# Patient Record
Sex: MALE | Race: WHITE | Employment: FULL TIME | URBAN - METROPOLITAN AREA
[De-identification: names, ages, dates, MRNs, and addresses within clinical notes are randomized per-mention and may not be internally consistent; named-entity substitution may affect disease eponyms.]

---

## 2020-01-07 ENCOUNTER — OFFICE VISIT (OUTPATIENT)
Dept: INTERNAL MEDICINE | Age: 40
End: 2020-01-07
Payer: COMMERCIAL

## 2020-01-07 VITALS
TEMPERATURE: 97.7 F | OXYGEN SATURATION: 98 % | SYSTOLIC BLOOD PRESSURE: 114 MMHG | HEART RATE: 56 BPM | BODY MASS INDEX: 24.28 KG/M2 | DIASTOLIC BLOOD PRESSURE: 66 MMHG | WEIGHT: 169.6 LBS | HEIGHT: 70 IN

## 2020-01-07 PROCEDURE — G8427 DOCREV CUR MEDS BY ELIG CLIN: HCPCS | Performed by: NURSE PRACTITIONER

## 2020-01-07 PROCEDURE — 4004F PT TOBACCO SCREEN RCVD TLK: CPT | Performed by: NURSE PRACTITIONER

## 2020-01-07 PROCEDURE — G8420 CALC BMI NORM PARAMETERS: HCPCS | Performed by: NURSE PRACTITIONER

## 2020-01-07 PROCEDURE — 99213 OFFICE O/P EST LOW 20 MIN: CPT | Performed by: NURSE PRACTITIONER

## 2020-01-07 PROCEDURE — G8484 FLU IMMUNIZE NO ADMIN: HCPCS | Performed by: NURSE PRACTITIONER

## 2020-01-07 RX ORDER — CEPHALEXIN 500 MG/1
500 CAPSULE ORAL 2 TIMES DAILY
Qty: 20 CAPSULE | Refills: 0 | Status: SHIPPED | OUTPATIENT
Start: 2020-01-07 | End: 2020-01-17

## 2020-01-07 RX ORDER — METHYLPREDNISOLONE 4 MG/1
TABLET ORAL
Qty: 1 KIT | Refills: 0 | Status: SHIPPED | OUTPATIENT
Start: 2020-01-07 | End: 2020-01-13

## 2020-01-07 SDOH — HEALTH STABILITY: MENTAL HEALTH: HOW OFTEN DO YOU HAVE A DRINK CONTAINING ALCOHOL?: NEVER

## 2020-01-07 ASSESSMENT — PATIENT HEALTH QUESTIONNAIRE - PHQ9
1. LITTLE INTEREST OR PLEASURE IN DOING THINGS: 0
SUM OF ALL RESPONSES TO PHQ9 QUESTIONS 1 & 2: 0
SUM OF ALL RESPONSES TO PHQ QUESTIONS 1-9: 0
2. FEELING DOWN, DEPRESSED OR HOPELESS: 0
SUM OF ALL RESPONSES TO PHQ QUESTIONS 1-9: 0

## 2020-01-07 NOTE — PROGRESS NOTES
Subjective  Latrell Reaevs, 37 y.o. male presents today with:  Chief Complaint   Patient presents with    Rash     \"keeps me up at night\" - left ankle - x couple days, has had rashes in the same place previously - no otc - red and scabby, itching        Rash   This is a new problem. Episode onset: x1 week ago. Pt states he did use a different detergent recently. The problem has been gradually worsening since onset. The affected locations include the left ankle. The rash is characterized by dryness, itchiness and redness. He was exposed to nothing. Pertinent negatives include no congestion, cough, diarrhea, fatigue, fever, joint pain, rhinorrhea, shortness of breath, sore throat or vomiting. Past treatments include nothing. Review of Systems   Constitutional: Negative for chills, fatigue and fever. HENT: Negative for congestion, ear pain, rhinorrhea and sore throat. Eyes: Negative for discharge, redness and visual disturbance. Respiratory: Negative for cough, chest tightness, shortness of breath and wheezing. Cardiovascular: Negative for chest pain, palpitations and leg swelling. Gastrointestinal: Negative for abdominal pain, diarrhea, nausea and vomiting. Musculoskeletal: Negative for arthralgias, back pain, gait problem, joint pain and joint swelling. Skin: Positive for rash. Negative for pallor and wound. Neurological: Negative for dizziness, light-headedness and headaches. Psychiatric/Behavioral: Negative for sleep disturbance and suicidal ideas. PMH, Surgical Hx, Family Hx, and Social Hx reviewed and updated. Health Maintenance reviewed. Objective  Vitals:    01/07/20 1235   BP: 114/66   Pulse: 56   Temp: 97.7 °F (36.5 °C)   SpO2: 98%   Weight: 169 lb 9.6 oz (76.9 kg)   Height: 5' 10\" (1.778 m)     BP Readings from Last 3 Encounters:   01/07/20 114/66     Wt Readings from Last 3 Encounters:   01/07/20 169 lb 9.6 oz (76.9 kg)     Physical Exam  Vitals signs reviewed. encounter. Orders Placed This Encounter   Medications    cephALEXin (KEFLEX) 500 MG capsule     Sig: Take 1 capsule by mouth 2 times daily for 10 days     Dispense:  20 capsule     Refill:  0    methylPREDNISolone (MEDROL DOSEPACK) 4 MG tablet     Sig: Take by mouth as directed. Dispense:  1 kit     Refill:  0     Return in about 2 weeks (around 1/21/2020), or if symptoms worsen or fail to improve. Reviewed with the patient: current clinical status,medications, activities and diet. Epsom salt soaks  Antihistamine as needed    Side effects, adverse effects of themedication prescribed today, as well as treatment plan/ rationale and result expectations have been discussed with the patient who expresses understanding and desires to proceed. Close follow up to evaluate treatment results and for coordination of care. I have reviewed the patient's medical history in detail and updated the computerized patient record.     JOCELINE Franco

## 2020-01-08 ASSESSMENT — ENCOUNTER SYMPTOMS
RHINORRHEA: 0
DIARRHEA: 0
WHEEZING: 0
BACK PAIN: 0
SORE THROAT: 0
VOMITING: 0
NAUSEA: 0
EYE REDNESS: 0
ABDOMINAL PAIN: 0
SHORTNESS OF BREATH: 0
CHEST TIGHTNESS: 0
COUGH: 0
EYE DISCHARGE: 0

## 2020-09-05 ENCOUNTER — HOSPITAL ENCOUNTER (EMERGENCY)
Age: 40
Discharge: PSYCHIATRIC OR SUBSTANCE USE FACILITY | End: 2020-09-06

## 2020-09-05 LAB
ACETAMINOPHEN LEVEL: <5 UG/ML (ref 10–30)
ALBUMIN SERPL-MCNC: 4.3 G/DL (ref 3.5–4.6)
ALP BLD-CCNC: 69 U/L (ref 35–104)
ALT SERPL-CCNC: 18 U/L (ref 0–41)
AMPHETAMINE SCREEN, URINE: POSITIVE
ANION GAP SERPL CALCULATED.3IONS-SCNC: 14 MEQ/L (ref 9–15)
AST SERPL-CCNC: 18 U/L (ref 0–40)
BARBITURATE SCREEN URINE: ABNORMAL
BASOPHILS ABSOLUTE: 0 K/UL (ref 0–0.2)
BASOPHILS RELATIVE PERCENT: 0.7 %
BENZODIAZEPINE SCREEN, URINE: ABNORMAL
BILIRUB SERPL-MCNC: 0.3 MG/DL (ref 0.2–0.7)
BILIRUBIN URINE: NEGATIVE
BLOOD, URINE: NEGATIVE
BUN BLDV-MCNC: 18 MG/DL (ref 6–20)
CALCIUM SERPL-MCNC: 9.1 MG/DL (ref 8.5–9.9)
CANNABINOID SCREEN URINE: POSITIVE
CHLORIDE BLD-SCNC: 99 MEQ/L (ref 95–107)
CHOLESTEROL, TOTAL: 218 MG/DL (ref 0–199)
CLARITY: CLEAR
CO2: 22 MEQ/L (ref 20–31)
COCAINE METABOLITE SCREEN URINE: ABNORMAL
COLOR: YELLOW
CREAT SERPL-MCNC: 1.06 MG/DL (ref 0.7–1.2)
EOSINOPHILS ABSOLUTE: 0.2 K/UL (ref 0–0.7)
EOSINOPHILS RELATIVE PERCENT: 3.2 %
ETHANOL PERCENT: NORMAL G/DL
ETHANOL: <10 MG/DL (ref 0–0.08)
GFR AFRICAN AMERICAN: >60
GFR NON-AFRICAN AMERICAN: >60
GLOBULIN: 3 G/DL (ref 2.3–3.5)
GLUCOSE BLD-MCNC: 98 MG/DL (ref 70–99)
GLUCOSE URINE: NEGATIVE MG/DL
HCT VFR BLD CALC: 43.6 % (ref 42–52)
HDLC SERPL-MCNC: 65 MG/DL (ref 40–59)
HEMOGLOBIN: 15.1 G/DL (ref 14–18)
KETONES, URINE: NEGATIVE MG/DL
LDL CHOLESTEROL CALCULATED: 107 MG/DL (ref 0–129)
LEUKOCYTE ESTERASE, URINE: NEGATIVE
LYMPHOCYTES ABSOLUTE: 2.1 K/UL (ref 1–4.8)
LYMPHOCYTES RELATIVE PERCENT: 29.3 %
Lab: ABNORMAL
MCH RBC QN AUTO: 31.8 PG (ref 27–31.3)
MCHC RBC AUTO-ENTMCNC: 34.6 % (ref 33–37)
MCV RBC AUTO: 91.8 FL (ref 80–100)
METHADONE SCREEN, URINE: ABNORMAL
MONOCYTES ABSOLUTE: 0.6 K/UL (ref 0.2–0.8)
MONOCYTES RELATIVE PERCENT: 8.9 %
NEUTROPHILS ABSOLUTE: 4.2 K/UL (ref 1.4–6.5)
NEUTROPHILS RELATIVE PERCENT: 57.9 %
NITRITE, URINE: NEGATIVE
OPIATE SCREEN URINE: ABNORMAL
OXYCODONE URINE: ABNORMAL
PDW BLD-RTO: 13.3 % (ref 11.5–14.5)
PH UA: 6 (ref 5–9)
PHENCYCLIDINE SCREEN URINE: ABNORMAL
PLATELET # BLD: 212 K/UL (ref 130–400)
POTASSIUM SERPL-SCNC: 4 MEQ/L (ref 3.4–4.9)
PROPOXYPHENE SCREEN: ABNORMAL
PROTEIN UA: NEGATIVE MG/DL
RBC # BLD: 4.75 M/UL (ref 4.7–6.1)
SALICYLATE, SERUM: <0.3 MG/DL (ref 15–30)
SARS-COV-2, NAAT: NOT DETECTED
SODIUM BLD-SCNC: 135 MEQ/L (ref 135–144)
SPECIFIC GRAVITY UA: 1.02 (ref 1–1.03)
TOTAL CK: 91 U/L (ref 0–190)
TOTAL PROTEIN: 7.3 G/DL (ref 6.3–8)
TRIGL SERPL-MCNC: 229 MG/DL (ref 0–150)
URINE REFLEX TO CULTURE: NORMAL
UROBILINOGEN, URINE: 0.2 E.U./DL
WBC # BLD: 7.3 K/UL (ref 4.8–10.8)

## 2020-09-05 PROCEDURE — 6370000000 HC RX 637 (ALT 250 FOR IP): Performed by: PHYSICIAN ASSISTANT

## 2020-09-05 PROCEDURE — 80053 COMPREHEN METABOLIC PANEL: CPT

## 2020-09-05 PROCEDURE — 36415 COLL VENOUS BLD VENIPUNCTURE: CPT

## 2020-09-05 PROCEDURE — U0002 COVID-19 LAB TEST NON-CDC: HCPCS

## 2020-09-05 PROCEDURE — 6370000000 HC RX 637 (ALT 250 FOR IP): Performed by: NURSE PRACTITIONER

## 2020-09-05 PROCEDURE — G0480 DRUG TEST DEF 1-7 CLASSES: HCPCS

## 2020-09-05 PROCEDURE — 85025 COMPLETE CBC W/AUTO DIFF WBC: CPT

## 2020-09-05 PROCEDURE — 81003 URINALYSIS AUTO W/O SCOPE: CPT

## 2020-09-05 PROCEDURE — 99285 EMERGENCY DEPT VISIT HI MDM: CPT

## 2020-09-05 PROCEDURE — 80061 LIPID PANEL: CPT

## 2020-09-05 PROCEDURE — 82550 ASSAY OF CK (CPK): CPT

## 2020-09-05 PROCEDURE — 80307 DRUG TEST PRSMV CHEM ANLYZR: CPT

## 2020-09-05 RX ORDER — TRAZODONE HYDROCHLORIDE 50 MG/1
50 TABLET ORAL ONCE
Status: COMPLETED | OUTPATIENT
Start: 2020-09-05 | End: 2020-09-05

## 2020-09-05 RX ORDER — HYDROXYZINE PAMOATE 50 MG/1
100 CAPSULE ORAL ONCE
Status: COMPLETED | OUTPATIENT
Start: 2020-09-05 | End: 2020-09-05

## 2020-09-05 RX ORDER — PROPRANOLOL HYDROCHLORIDE 20 MG/1
20 TABLET ORAL 2 TIMES DAILY PRN
COMMUNITY
Start: 2020-03-09

## 2020-09-05 RX ORDER — MIRTAZAPINE 45 MG/1
45 TABLET, FILM COATED ORAL NIGHTLY
COMMUNITY
Start: 2020-03-09

## 2020-09-05 RX ORDER — IBUPROFEN 800 MG/1
800 TABLET ORAL ONCE
Status: COMPLETED | OUTPATIENT
Start: 2020-09-05 | End: 2020-09-05

## 2020-09-05 RX ORDER — ACETAMINOPHEN 500 MG
1000 TABLET ORAL ONCE
Status: COMPLETED | OUTPATIENT
Start: 2020-09-05 | End: 2020-09-05

## 2020-09-05 RX ORDER — TRAZODONE HYDROCHLORIDE 50 MG/1
50 TABLET ORAL NIGHTLY PRN
COMMUNITY
Start: 2020-03-09

## 2020-09-05 RX ORDER — CALCIUM CARBONATE 200(500)MG
500 TABLET,CHEWABLE ORAL ONCE
Status: COMPLETED | OUTPATIENT
Start: 2020-09-05 | End: 2020-09-06

## 2020-09-05 RX ORDER — PROPRANOLOL HYDROCHLORIDE 20 MG/1
20 TABLET ORAL ONCE
Status: COMPLETED | OUTPATIENT
Start: 2020-09-05 | End: 2020-09-05

## 2020-09-05 RX ADMIN — ACETAMINOPHEN 1000 MG: 500 TABLET ORAL at 22:05

## 2020-09-05 RX ADMIN — HYDROXYZINE PAMOATE 100 MG: 50 CAPSULE ORAL at 22:05

## 2020-09-05 RX ADMIN — MIRTAZAPINE 45 MG: 30 TABLET, FILM COATED ORAL at 22:05

## 2020-09-05 RX ADMIN — IBUPROFEN 800 MG: 800 TABLET, FILM COATED ORAL at 03:42

## 2020-09-05 RX ADMIN — TRAZODONE HYDROCHLORIDE 50 MG: 50 TABLET ORAL at 22:05

## 2020-09-05 RX ADMIN — PROPRANOLOL HYDROCHLORIDE 20 MG: 20 TABLET ORAL at 22:05

## 2020-09-05 ASSESSMENT — PAIN DESCRIPTION - ORIENTATION: ORIENTATION: MID

## 2020-09-05 ASSESSMENT — ENCOUNTER SYMPTOMS
RHINORRHEA: 0
WHEEZING: 0
ABDOMINAL DISTENTION: 0
DIARRHEA: 0
CONSTIPATION: 0
BACK PAIN: 0
SHORTNESS OF BREATH: 0
TROUBLE SWALLOWING: 0
COUGH: 0
BLOOD IN STOOL: 0
SORE THROAT: 0
COLOR CHANGE: 0
CHEST TIGHTNESS: 0
VOMITING: 0
NAUSEA: 0
ABDOMINAL PAIN: 0

## 2020-09-05 ASSESSMENT — PATIENT HEALTH QUESTIONNAIRE - PHQ9: SUM OF ALL RESPONSES TO PHQ QUESTIONS 1-9: 25

## 2020-09-05 ASSESSMENT — PAIN SCALES - GENERAL
PAINLEVEL_OUTOF10: 5
PAINLEVEL_OUTOF10: 6
PAINLEVEL_OUTOF10: 6

## 2020-09-05 ASSESSMENT — PAIN DESCRIPTION - FREQUENCY: FREQUENCY: INTERMITTENT

## 2020-09-05 ASSESSMENT — PAIN DESCRIPTION - DESCRIPTORS: DESCRIPTORS: ACHING;BURNING

## 2020-09-05 ASSESSMENT — PAIN DESCRIPTION - LOCATION: LOCATION: BACK

## 2020-09-05 ASSESSMENT — PAIN DESCRIPTION - PAIN TYPE: TYPE: CHRONIC PAIN

## 2020-09-05 NOTE — ED NOTES
Breakfast tray received, remains sleeping     Connie Mathis RN  09/05/20 [address] [facility], [address] [place] [address]  09/05/20 4691

## 2020-09-05 NOTE — ED NOTES
Attempted to call [facility], no answer at any intake # to check on bed availability.      Regina Melton RN  09/05/20 6891

## 2020-09-05 NOTE — ED NOTES
Pt continues talking with Marisol Luong on the phone for his [address] [facility], [address], pt is cooperating with the [facility] clinician     Janice Nassar RN  09/05/20 2006

## 2020-09-05 NOTE — ED NOTES
Pt in bed area quiet and cooperative with no problems and no C/O any kind expressed. Pt has even respirations no cough, no SOB, and no respiratory distress noted.      Tara Kramer RN  09/05/20 7661

## 2020-09-05 NOTE — ED NOTES
Pt remains asleep groggy on approach and pt is aware of need for urine to send to the lab.      Bill Cooper RN  09/05/20 6207

## 2020-09-05 NOTE — ED NOTES
Spoke with Dr Ari Naqvi advised that the [facility] will not approve for indigent bed at this time. Informed Dr Ari Naqvi that [facility] and that they are willing to review tomorrow for admission. Dr Ari Naqvi was informed [facility] clinician recommendation of level of care was to discharge home. Dr Ari Naqvi advised to board pt tonight for the [facility] to review [facility]'s chart for admission in AM. Pt verbalized understanding plan of care.      Brennon Gagnon RN  09/05/20 5543

## 2020-09-05 NOTE — ED NOTES
Awakened the pt and advised needs urine, and his breakfast was here for him. Pt looked up mumbled and lay back down without getting up. Pt was drinking fluids and ate poor for breakfast.  Pt has even respirations no problems and no C/O any kind expressed. Pt again explained the need for urine. Pt has no cough, no SOB, and no respiratory distress noted.      Alfonso Quispe RN  09/05/20 3019

## 2020-09-05 NOTE — ED NOTES
Pt remains in bed area quiet and cooperative with no problems and no C/O any kind expressed. Pt has even respirations with no cough, no SOB, and no respiratory distress noted.      Chito Koroma RN  09/05/20 2371

## 2020-09-05 NOTE — ED NOTES
Pt report received from [facility]. Pt waiting for medical clearance.  Pt still needs to provide urine specimen in order to call [place] for bed     Connie Mathis RN  09/05/20 6677

## 2020-09-05 NOTE — ED PROVIDER NOTES
[address] [facility] ED  EMERGENCY DEPARTMENT ENCOUNTER      Pt Name: Leticia Akbar  MRN: [id number]  Marygfmaryann 1980  Date of evaluation: 9/5/2020  Provider: Shana Islas DO    CHIEF COMPLAINT       Chief Complaint   Patient presents with    Mental Health Problem     PTSD         HISTORY OF PRESENT ILLNESS   (Location/Symptom, Timing/Onset,Context/Setting, Quality, Duration, Modifying Factors, Severity)  Note limiting factors. Leticia Akbar is a 41 y.o. male who presents to the emergency department for mental health evaluation. Patient states he suffers from severe PTSD anxiety bouts of depression and significant sleep disruption. Patient states that none of these are new, states that he follows with the [facility] for his PTSD related to his [employer]. States he retired from [employer] in 2017 of the past few years has been attempting to follow-up and establish with a more regulated mental health care. He states he does have a psychiatrist at the [place] and is on a few medications. He states however he recently moved and his sleep disruption has been much worse over the past few months. He states that due to this is been having increasing his anxiety and feeling paranoid daily. He states that he additionally has been having severe bouts of depression but denies any suicidal ideations. He states that he does wish that he would just fall asleep and not wake up. He states however he would not kill himself as he does have a daughter and would not want to place that burden upon her. Does not have any plans of how he would harm himself. He states that he feels at times that he has hallucinations but states that this is also related to his PTSD as he continues to feel he is hearing things and has to sleep with a fan on for white noise.   He denies any drug use recently states he has tried street drugs in the past to self medicate but found no success in these is the only PAST MEDICAL HISTORY   History reviewed. No pertinent past medical history. History reviewed. No pertinent surgical history.   Social History     Socioeconomic History    Marital status: Single     Spouse name: None    Number of children: None    Years of education: None    Highest education level: None   Occupational History    None   Social Needs    Financial resource strain: None    Food insecurity     Worry: None     Inability: None    Transportation needs     Medical: None     Non-medical: None   Tobacco Use    Smoking status: Current Every Day Smoker     Packs/day: 0.25     Years: 25.00     Pack years: 6.25     Types: Cigarettes     Start date: 1/7/1995    Smokeless tobacco: Never Used   Substance and Sexual Activity    Alcohol use: Never     Frequency: Never    Drug use: Yes     Frequency: 2.0 times per week     Types: Marijuana    Sexual activity: None   Lifestyle    Physical activity     Days per week: None     Minutes per session: None    Stress: None   Relationships    Social connections     Talks on phone: None     Gets together: None     Attends [employer] service: None     Active member of club or organization: None     Attends meetings of clubs or organizations: None     Relationship status: None    Intimate partner violence     Fear of current or ex partner: None     Emotionally abused: None     Physically abused: None     Forced sexual activity: None   Other Topics Concern    None   Social History Narrative    None       SCREENINGS    [place] Coma Scale  Eye Opening: Spontaneous  Best Verbal Response: Oriented  Best Motor Response: Obeys commands  Cheryl Coma Scale Score: 15        PHYSICAL EXAM    (up to 7 for level 4, 8 or more for level 5)     ED Triage Vitals [09/05/20 0132]   BP Temp Temp Source Pulse Resp SpO2 Height Weight   (!) 127/101 97.8 °F (36.6 °C) Oral 66 20 97 % 5' 10\" (1.778 m) 175 lb (79.4 kg)       Physical Exam  Constitutional:       General: He is not in content does not include homicidal or suicidal ideation. Thought content does not include homicidal or suicidal plan. Cognition and Memory: Cognition and memory normal.         Judgment: Judgment normal.         RESULTS     EKG: All EKG's are interpreted by the Emergency Department Physician who either signs or Co-signsthis chart in the absence of a cardiologist.        RADIOLOGY:   Shmuel Files such as CT, Ultrasound and MRI are read by the radiologist. Plain radiographic images are visualized and preliminarily interpreted by the emergency physician with the below findings:        Interpretation per the Radiologist below, if available at the time ofthis note:    No orders to display         ED BEDSIDE ULTRASOUND:   Performed by ED Physician - none    LABS:  Labs Reviewed   CBC WITH AUTO DIFFERENTIAL - Abnormal; Notable for the following components:       Result Value    MCH 31.8 (*)     All other components within normal limits   ACETAMINOPHEN LEVEL - Abnormal; Notable for the following components:    Acetaminophen Level <5 (*)     All other components within normal limits   SALICYLATE LEVEL - Abnormal; Notable for the following components:    Salicylate, Serum <1.4 (*)     All other components within normal limits   URINE DRUG SCREEN - Abnormal; Notable for the following components:    Amphetamine Screen, Urine POSITIVE (*)     Cannabinoid Scrn, Ur POSITIVE (*)     All other components within normal limits   LIPID PANEL - Abnormal; Notable for the following components:    Cholesterol, Total 218 (*)     Triglycerides 229 (*)     HDL 65 (*)     All other components within normal limits   COVID-19   CK   ETHANOL   URINE RT REFLEX TO CULTURE   COMPREHENSIVE METABOLIC PANEL   TSH WITHOUT REFLEX       All other labs were within normal range or not returned as of this dictation.     EMERGENCY DEPARTMENT COURSE and DIFFERENTIAL DIAGNOSIS/MDM:   Vitals:    Vitals:    09/05/20 1123 09/05/20 2115 09/05/20 2205 09/06/20 0857   BP: 116/75 108/79 123/80 112/76   Pulse: 62 58 64 58   Resp: 18 18  18   Temp: 97.7 °F (36.5 °C) 97.9 °F (36.6 °C)  97.6 °F (36.4 °C)   TempSrc: Oral Oral  Oral   SpO2: 97% 98%  97%   Weight:       Height:                MDM patient is medically clear for behavioral health evaluation and placement if necessary    CRITICAL CARE TIME       CONSULTS:  None    PROCEDURES:  Unless otherwise noted below, none     Procedures    FINAL IMPRESSION      1. PTSD (post-traumatic stress disorder)    2. Marijuana abuse          DISPOSITION/PLAN   DISPOSITION        PATIENT REFERRED TO:  No follow-up provider specified.     DISCHARGE MEDICATIONS:  New Prescriptions    No medications on file          (Please notethat portions of this note were completed with a voice recognition program.  Efforts were made to edit the dictations but occasionally words are mis-transcribed.)    Governor WestervilleDO (electronically signed)  Attending Emergency Physician         Governor DO Basilia  09/06/20 0791

## 2020-09-05 NOTE — ED NOTES
[address] states that the [facility] advised that they are not accepting pt's this late on the weekend. [facility] informed will accept all information to review for tomorrow possible admission.      Slava Padilla RN  09/05/20 6569

## 2020-09-05 NOTE — ED TRIAGE NOTES
Patient has PTSD and does not feel safe at home. Patient denies any thought of hurting him self but does not want to talk about how he is feeling .

## 2020-09-05 NOTE — ED NOTES
Teodora Ramírez called to advise after her assessment and discussion with her supervisor that they are recommending discharge. The [facility] was considering placement at their CSU unit but are unable to meet his needs due to the [place] weekend.  Teodora Ramírez advising that if pt is still struggling with PTSD symptoms he can call [facility] crisis unit on Monday to revisit about CSU admission     [facility]  09/05/20 4857

## 2020-09-05 NOTE — ED NOTES
Call placed to [facility] admissions to inquire about patient's transfer status. They report that bed control is gone for the day and will not return until 8am tomorrow. Unable to provide further detail on this patient and potential transfer to the [facility].       Bethany Lino RN  09/05/20 4749

## 2020-09-05 NOTE — ED NOTES
[facility] to refer pt to [address] for psych placment     Delaney Moreno, HERNAN  09/05/20 9499

## 2020-09-05 NOTE — ED NOTES
Collateral information obtained from pt's daughter, Parvez Alves [phone]. Pt gave permission to speak with his daughter he admits that they have a close relationship. Daughter reports she has no concerns regarding his safety or others. She stated he has never made any suicidal statements or gestures. Daughter feels comfortable with her father returning home and in the community. Pt stated if need be his daughter could stay with him mery.      Connie Mathis RN  09/05/20 1045

## 2020-09-05 NOTE — ED NOTES
Pt awakened again for see if he could void and he looked up and layed back down  Will continue to awaken pt for urine, pt has even respirations no problems and no C/O any kind expressed.      Toma Carlin RN  09/05/20 1537

## 2020-09-06 VITALS
RESPIRATION RATE: 18 BRPM | TEMPERATURE: 97.6 F | OXYGEN SATURATION: 97 % | WEIGHT: 175 LBS | DIASTOLIC BLOOD PRESSURE: 76 MMHG | HEART RATE: 58 BPM | SYSTOLIC BLOOD PRESSURE: 112 MMHG | BODY MASS INDEX: 25.05 KG/M2 | HEIGHT: 70 IN

## 2020-09-06 PROCEDURE — 6370000000 HC RX 637 (ALT 250 FOR IP): Performed by: PHYSICIAN ASSISTANT

## 2020-09-06 RX ADMIN — ANTACID TABLETS 500 MG: 500 TABLET, CHEWABLE ORAL at 00:17

## 2020-09-06 NOTE — ED NOTES
Pt is in bed area quiet and cooperative with no problems and no C/O any kind expressed. Pt has even respirations no cough, no SOB, and no respiratory distress noted.      Erin Ramos RN  09/06/20 6218

## 2020-09-06 NOTE — ED NOTES
Pt report received from [facility]. [facility].  Pt resting at this time voices no complaints     [facility]  09/06/20 9700

## 2020-09-06 NOTE — ED NOTES
Pt signed voluntary admission form for the [place].  Form faxed back to hospitals  09/06/20 7146

## 2020-09-06 NOTE — ED NOTES
Home medications reviewed with patient, report given to Darryle Purple for HS medications     Hiram العلي RN  09/06/20 0021       Hiram العلي RN  09/06/20 0366

## 2020-09-06 NOTE — ED NOTES
Covid results faxed to [facility] per their request.  Fax unsuccessful called [facility] to see if they can fax results to [address], RN  09/06/20 [address] [facility]  09/06/20 4502

## 2020-09-06 NOTE — ED NOTES
Pt is asleep with even respirations no problems or C/O any kind expressed.        Odilia Ahuja RN  09/06/20 1553

## 2020-09-06 NOTE — ED NOTES
Pt is in bed area quiet and cooperative with no problems and no C/O any kind expressed. Pt has even respirations no cough, no SOB, and no respiratory distress noted.   Pt is aware of his transfer to [place] and has been cooperative with his transfer to the [place] at [address] RN  09/06/20 1100

## 2020-09-06 NOTE — ED NOTES
Patient complains of heartburn, report given to DARRYL  [facility]  09/06/20 0020

## 2020-09-06 NOTE — ED NOTES
Pt is in bed area quiet and cooperative with no problems and no C/O any kind expressed. Pt has even respirations no SOB, no cough, and no respiratory distress noted.      Shonna Cooper RN  09/06/20 3944

## 2020-09-06 NOTE — ED NOTES
Pt received breakfast tray. Tolerating well. Pt updated on plan of care.  Advised that the [facility] physician has his chart for review and that Jacey Coulter will be in contact with KIMBERLEE Yang RN  09/06/20 1611

## 2020-09-06 NOTE — ED NOTES
Patient complains of anxiety, rates 5 on scale of 1-10, medicated as ordered     Lorna Brooks RN  09/06/20 9698